# Patient Record
Sex: FEMALE | Race: WHITE | URBAN - METROPOLITAN AREA
[De-identification: names, ages, dates, MRNs, and addresses within clinical notes are randomized per-mention and may not be internally consistent; named-entity substitution may affect disease eponyms.]

---

## 2020-09-23 ENCOUNTER — APPOINTMENT (OUTPATIENT)
Dept: URBAN - METROPOLITAN AREA SURGERY 18 | Age: 85
Setting detail: DERMATOLOGY
End: 2020-09-23

## 2020-09-23 VITALS — TEMPERATURE: 97.8 F | HEART RATE: 73 BPM | DIASTOLIC BLOOD PRESSURE: 81 MMHG | SYSTOLIC BLOOD PRESSURE: 141 MMHG

## 2020-09-23 PROBLEM — C44.529 SQUAMOUS CELL CARCINOMA OF SKIN OF OTHER PART OF TRUNK: Status: ACTIVE | Noted: 2020-09-23

## 2020-09-23 PROCEDURE — OTHER MOHS SURGERY: OTHER

## 2020-09-23 PROCEDURE — 17313 MOHS 1 STAGE T/A/L: CPT

## 2020-09-23 PROCEDURE — OTHER REFERRAL CORRESPONDENCE: OTHER

## 2020-09-23 PROCEDURE — OTHER COUNSELING: OTHER

## 2020-09-23 PROCEDURE — 13101 CMPLX RPR TRUNK 2.6-7.5 CM: CPT

## 2020-09-23 PROCEDURE — OTHER ADDITIONAL NOTES: OTHER

## 2020-09-23 NOTE — PROCEDURE: ADDITIONAL NOTES
Detail Level: Simple
Additional Notes: * Prior to the procedure the patient's pacemaker/leads were palpated and outlined in gentian marker. Local anesthesia was used to tumesce the skin/soft tissue surrounding and underlying the tumor to increase the space between the tumor and the nearby pacemaker and pacer leads. The tumor was carefully debulked and the first Mohs stage was taken in the subcutaneous place immediately underneath the debulking. Careful dissection, palpation and visual inspection was used during the first surgical stage to ensure the tissue plane containing the pacemaker and device lead was not entered. Hemostasis was obtained as above with hot tipped cautery (no current containing device was used). Minimal undermining and minimal standing cone removal was done in the area marked preoperatively containing the pacemaker and pacer lead. Inferior to the device and  pacer lead an appropriately sized standing cone and wide undermining was performed bilaterally in the superficial adipose. If any proximal standing cone remains following scar line maturation the patient will return for revision.

## 2020-09-23 NOTE — PROCEDURE: MOHS SURGERY
environmental exposure Incidental Superficial Basal Cell Carcinoma Histology Text: Basaloid budding with peripheral palisading and focal stromal retraction was noted microscopically.

## 2020-09-23 NOTE — PROCEDURE: MOHS SURGERY
Body Location Override (Optional - Billing Will Still Be Based On Selected Body Map Location If Applicable): right lateral superior chest (\"right chest\")

## 2021-09-02 ENCOUNTER — APPOINTMENT (OUTPATIENT)
Dept: URBAN - METROPOLITAN AREA SURGERY 18 | Age: 86
Setting detail: DERMATOLOGY
End: 2021-09-06

## 2021-09-02 VITALS — TEMPERATURE: 97.3 F | HEART RATE: 66 BPM | SYSTOLIC BLOOD PRESSURE: 125 MMHG | DIASTOLIC BLOOD PRESSURE: 66 MMHG

## 2021-09-02 PROBLEM — C44.619 BASAL CELL CARCINOMA OF SKIN OF LEFT UPPER LIMB, INCLUDING SHOULDER: Status: ACTIVE | Noted: 2021-09-02

## 2021-09-02 PROCEDURE — OTHER MOHS SURGERY: OTHER

## 2021-09-02 PROCEDURE — 13122 CMPLX RPR S/A/L ADDL 5 CM/>: CPT

## 2021-09-02 PROCEDURE — OTHER REFERRAL CORRESPONDENCE: OTHER

## 2021-09-02 PROCEDURE — 17313 MOHS 1 STAGE T/A/L: CPT

## 2021-09-02 PROCEDURE — 13121 CMPLX RPR S/A/L 2.6-7.5 CM: CPT

## 2021-09-02 PROCEDURE — OTHER COUNSELING: OTHER

## 2021-09-09 ENCOUNTER — APPOINTMENT (OUTPATIENT)
Dept: URBAN - METROPOLITAN AREA SURGERY 18 | Age: 86
Setting detail: DERMATOLOGY
End: 2021-09-12

## 2021-09-09 VITALS
TEMPERATURE: 97.5 F | SYSTOLIC BLOOD PRESSURE: 128 MMHG | HEART RATE: 58 BPM | DIASTOLIC BLOOD PRESSURE: 68 MMHG | RESPIRATION RATE: 20 BRPM

## 2021-09-09 DIAGNOSIS — Z48.817 ENCOUNTER FOR SURGICAL AFTERCARE FOLLOWING SURGERY ON THE SKIN AND SUBCUTANEOUS TISSUE: ICD-10-CM

## 2021-09-09 PROBLEM — C44.311 BASAL CELL CARCINOMA OF SKIN OF NOSE: Status: ACTIVE | Noted: 2021-09-09

## 2021-09-09 PROCEDURE — OTHER POST-OP WOUND CHECK: OTHER

## 2021-09-09 PROCEDURE — 17311 MOHS 1 STAGE H/N/HF/G: CPT | Mod: 79

## 2021-09-09 PROCEDURE — OTHER REFERRAL CORRESPONDENCE: OTHER

## 2021-09-09 PROCEDURE — 99024 POSTOP FOLLOW-UP VISIT: CPT

## 2021-09-09 PROCEDURE — 17312 MOHS ADDL STAGE: CPT | Mod: 79

## 2021-09-09 PROCEDURE — OTHER MOHS SURGERY: OTHER

## 2021-09-09 PROCEDURE — 14061 TIS TRNFR E/N/E/L10.1-30SQCM: CPT | Mod: 79

## 2021-09-09 PROCEDURE — OTHER COUNSELING: OTHER

## 2021-09-09 ASSESSMENT — LOCATION DETAILED DESCRIPTION DERM: LOCATION DETAILED: LEFT POSTERIOR SHOULDER

## 2021-09-09 ASSESSMENT — LOCATION SIMPLE DESCRIPTION DERM: LOCATION SIMPLE: LEFT SHOULDER

## 2021-09-09 ASSESSMENT — LOCATION ZONE DERM: LOCATION ZONE: ARM

## 2021-09-09 NOTE — PROCEDURE: POST-OP WOUND CHECK
Add 05217 Cpt? (Important Note: In 2017 The Use Of 47449 Is Being Tracked By Cms To Determine Future Global Period Reimbursement For Global Periods): yes
Detail Level: Detailed
Wound Evaluated By: Dr. Fredi Myles
Additional Comments: Appropriately healing surgical site. Clean and well approximated. Pt can start daily dressing changes until all crusting is gone. Site redressed with Vaseline tells and tape. Follow up as needed.

## 2021-09-09 NOTE — PROCEDURE: MOHS SURGERY
Tumor, Uncertain Cause  The body is constantly growing new cells to replace older ones. A tumor occurs when cells of the body begin to grow abnormally.  A BENIGN TUMOR is a growth that is not cancerous. It will remain in one place and grow slowly. They do not spread to other areas of the body and are rarely a threat to life.  Types Of Benign Tumors:   · Breast: Cyst (fluid filled sac)  Adenoma (overgrowth of glandular tissue)  · Skin: Lipoma (overgrowth of fat cells)  Sebaceous Cyst (sac filled with skin oils and dead skin cells)  · Thyroid: Colloid Nodule (overgrowth of normal thyroid tissue)  · Colon: Polyps (out-pouching of the moist lining of the intestine due to chronic inflammation)  · Lung: Granuloma (calcium deposits in an area of scarring from prior inflammation)  A MALIGNANT TUMOR is cancerous. It will invade neighboring tissues and can spread through the blood and lymph to other parts of the body.  Types Of Cancer:   · Carcinoma: grows in skin, lungs, breast, colon and intestines  · Sarcoma: grows in bones and muscle tissues  · Lymphoma: grows in the lymph nodes  · Leukemia: grows in the bone marrow and affects white blood cells  · Myeloma: grows in the bone marrow and affects cells of the immune system  Treatment   · Benign tumors can often be removed with surgery and usually do not grow back.  · Cancers are treated by surgery, chemotherapy, radiation or a combination of these methods.  Follow Up  with your doctor or as advised by our staff.  Get Prompt Medical Attention  if any of the following occur:  -- Blood in the stool  -- Unexpected weight loss (more than 10 pounds in less than 3 months)  -- A skin sore that does not heal  -- New lump in the breast or elsewhere  -- Indigestion or swallowing difficulty  -- A change in the appearance of a wart or mole  -- Persistent cough, hoarseness or bloody sputum  © 2347-4111 [employer]. [address] [place] [address], [place] [address]. All rights  reserved. This information is not intended as a substitute for professional medical care. Always follow your healthcare professional's instructions.         Purse String (Simple) Text: Given the location of the defect and the characteristics of the surrounding skin a purse string closure was deemed most appropriate.  Undermining was performed circumfirentially around the surgical defect.  A purse string suture was then placed and tightened.

## 2021-09-09 NOTE — PROCEDURE: MOHS SURGERY
Mart-1 - Negative Histology Text: MART-1 staining demonstrates a normal density and pattern of melanocytes along the dermal-epidermal junction. The surgical margins are negative for tumor cells. Statement Selected

## 2021-09-09 NOTE — PROCEDURE: MOHS SURGERY
Body Location Override (Optional - Billing Will Still Be Based On Selected Body Map Location If Applicable): Left Nasal Ala (\"Left Inferior Nasal Cheek\")

## 2021-09-09 NOTE — PROCEDURE: MOHS SURGERY
V-Y Flap Text: Given the location of the defect, inherent tension at the surgical site, and the proximity to free margins an island pedicle (V to Y) advancement flap was deemed most appropriate for wound reconstruction. The risks, benefits, and possible outcomes of this procedure were discussed along with the risks, benefits, and possible outcomes of other options for wound repair (including but not limited to: complex closure, other flaps, grafts, and second intention). The patient verbalized understanding and consent to the outlined procedure. The patient verbalized understanding that intraoperative conditions may necessitate a change in the outlined procedure resulting in modification of the original surgical plan. This decision may be made at the discretion of the surgeon, and is due to factors subject to change or that are difficult to predict preoperatively. \\n\\nUsing a sterile surgical marker, an appropriate island pedicle (V to Y) advancement flap was drawn incorporating the defect and placing the expected incisions within the relaxed skin tension lines where possible. The surgical site and surrounding skin were prepped and draped in sterile fashion.  The island pedicle advancement flap was incised and undermined within the appropriate surgical plane, creating a mobile flap supported by a central vascular pedicle.  The wound edges surrounding the flap and primary defect were undermined widely and bilaterally in the appropriate surgical plane taking care to preserve local arteries, veins, nerves, and other structures of anatomic importance. This created a sliding flap capable of advancing across the defect to close the wound under minimal tension. Hemostasis was achieved and then the wound was sutured in layered fashion.

## 2021-09-15 ENCOUNTER — APPOINTMENT (OUTPATIENT)
Dept: URBAN - METROPOLITAN AREA SURGERY 18 | Age: 86
Setting detail: DERMATOLOGY
End: 2021-09-19

## 2021-09-15 DIAGNOSIS — Z48.817 ENCOUNTER FOR SURGICAL AFTERCARE FOLLOWING SURGERY ON THE SKIN AND SUBCUTANEOUS TISSUE: ICD-10-CM

## 2021-09-15 PROCEDURE — 99024 POSTOP FOLLOW-UP VISIT: CPT

## 2021-09-15 PROCEDURE — OTHER POST-OP WOUND CHECK: OTHER

## 2021-09-15 ASSESSMENT — LOCATION ZONE DERM: LOCATION ZONE: NOSE

## 2021-09-15 ASSESSMENT — LOCATION DETAILED DESCRIPTION DERM: LOCATION DETAILED: LEFT NASAL ALA

## 2021-09-15 ASSESSMENT — LOCATION SIMPLE DESCRIPTION DERM: LOCATION SIMPLE: LEFT NOSE

## 2021-09-15 NOTE — PROCEDURE: POST-OP WOUND CHECK
Add 68483 Cpt? (Important Note: In 2017 The Use Of 64153 Is Being Tracked By Cms To Determine Future Global Period Reimbursement For Global Periods): yes
Additional Comments: Flap is well approximated and well vascularized without signs/symptoms of impending necrosis/ulceration, infection or poor wound healing. Flap site cleaned and dressed with Vaseline, Telfa, and tape. Pt informed to keep bandage on for one more week. Then to begin daily dressing changes until no crusting remains along suture lines. Follow up in 2 weeks, sooner with any change/new onset symptoms or concerns at surgical site.
Wound Evaluated By: Dr. Fredi Myles
Detail Level: Detailed

## 2021-09-29 ENCOUNTER — APPOINTMENT (OUTPATIENT)
Dept: URBAN - METROPOLITAN AREA SURGERY 18 | Age: 86
Setting detail: DERMATOLOGY
End: 2021-10-04

## 2021-09-29 DIAGNOSIS — Z48.817 ENCOUNTER FOR SURGICAL AFTERCARE FOLLOWING SURGERY ON THE SKIN AND SUBCUTANEOUS TISSUE: ICD-10-CM

## 2021-09-29 PROCEDURE — 99024 POSTOP FOLLOW-UP VISIT: CPT

## 2021-09-29 PROCEDURE — OTHER POST-OP WOUND CHECK: OTHER

## 2021-09-29 ASSESSMENT — LOCATION SIMPLE DESCRIPTION DERM: LOCATION SIMPLE: LEFT NOSE

## 2021-09-29 ASSESSMENT — LOCATION DETAILED DESCRIPTION DERM: LOCATION DETAILED: LEFT NASAL ALA

## 2021-09-29 ASSESSMENT — LOCATION ZONE DERM: LOCATION ZONE: NOSE

## 2021-09-29 NOTE — PROCEDURE: POST-OP WOUND CHECK
Detail Level: Detailed
Add 54925 Cpt? (Important Note: In 2017 The Use Of 02789 Is Being Tracked By Cms To Determine Future Global Period Reimbursement For Global Periods): yes
Wound Evaluated By: Dr. Fredi Myles
Additional Comments: Well healed, well contoured flap with excellent surgical outcome. Continued care reviewed (massage/vaseline application). Signs/symptoms that should prompt call and RTC reviewed. Patient pleased with result and will follow up as needed.
